# Patient Record
Sex: FEMALE | Race: WHITE
[De-identification: names, ages, dates, MRNs, and addresses within clinical notes are randomized per-mention and may not be internally consistent; named-entity substitution may affect disease eponyms.]

---

## 2021-05-29 ENCOUNTER — HOSPITAL ENCOUNTER (EMERGENCY)
Dept: HOSPITAL 33 - ED | Age: 81
Discharge: HOME | End: 2021-05-29
Payer: MEDICARE

## 2021-05-29 VITALS — HEART RATE: 78 BPM | SYSTOLIC BLOOD PRESSURE: 186 MMHG | DIASTOLIC BLOOD PRESSURE: 104 MMHG

## 2021-05-29 VITALS — OXYGEN SATURATION: 98 %

## 2021-05-29 DIAGNOSIS — E78.00: ICD-10-CM
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
## 2021-05-29 DIAGNOSIS — I10: ICD-10-CM
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
## 2021-05-29 DIAGNOSIS — S52.532A: Primary | ICD-10-CM

## 2021-05-29 DIAGNOSIS — I25.10: ICD-10-CM

## 2021-05-29 PROCEDURE — 29105 APPLICATION LONG ARM SPLINT: CPT

## 2021-05-29 PROCEDURE — 99284 EMERGENCY DEPT VISIT MOD MDM: CPT

## 2021-05-29 PROCEDURE — 73110 X-RAY EXAM OF WRIST: CPT

## 2021-05-29 NOTE — ERPHSYRPT
- History of Present Illness


Time Seen by Provider: 05/29/21 15:40


Source: patient


Exam Limitations: no limitations


Patient Subjective Stated Complaint: Pt states "I fell down two steps.  I 

stumbled and fell."


Triage Nursing Assessment: PT presented alert and oriented X 3, skin pwd Pt 

ambulates with assistance, pt has migdalia splint on her left lower arm. Pt CSM X 4


Physician History: 





Pt states "I fell down two steps.  I stumbled and fell." hurt my left wrist. 

denies any other injury


Occurred: just prior to arrival


Method of Injury: fell


Quality: sharpness


Severity of Pain-Max: moderate


Severity of Pain-Current: moderate


Extremities Pain Location: wrist: left


Modifying Factors: Improves With: nothing


Associated Symptoms: none


Allergies/Adverse Reactions: 








melon Adverse Reaction (Intermediate, Verified 05/29/21 15:33)


   Diarrhea


opiates Allergy (Intermediate, Uncoded 05/29/21 15:33)


   





Home Medications: 








Amlodipine Besylate 5 mg*** [Norvasc 5 mg***] 5 mg PO DAILY 05/29/21 [History]


Atorvastatin Calcium [Lipitor 40Mg] 40 mg PO DAILY 05/29/21 [History]


lisinopriL [Lisinopril] 20 mg PO DAILY 05/29/21 [History]





Hx Tetanus, Diphtheria Vaccination/Date Given: Yes


Hx Influenza Vaccination/Date Given: Yes


Hx Pneumococcal Vaccination/Date Given: No


Immunizations Up to Date: Yes





Travel Risk





- International Travel


Have you traveled outside of the country in past 3 weeks: No





- Coronavirus Screening


Are you exhibiting any of the following symptoms?: No


Close contact with a COVID-19 positive Pt in past 14-21 Days: No





- Vaccine Status


Have you recieved a Covid-19 vaccination: Yes


: Pfizer





- Vaccination Dates


Date of 2cond Vaccination (if applicable): 02/2021





- Review of Systems


Constitutional: No Fever, No Chills


Eyes: No Symptoms


Ears, Nose, & Throat: No Symptoms


Respiratory: No Cough, No Dyspnea


Cardiac: No Chest Pain, No Edema, No Syncope


Abdominal/Gastrointestinal: No Abdominal Pain, No Nausea, No Vomiting, No 

Diarrhea


Genitourinary Symptoms: No Dysuria


Musculoskeletal: Deformity (left wrist), Fall, No Back Pain, No Neck Pain


Skin: No Rash


Neurological: No Dizziness, No Focal Weakness, No Sensory Changes


Psychological: No Symptoms


Endocrine: No Symptoms


All Other Systems: Reviewed and Negative





- Past Medical History


Pertinent Past Medical History: Yes


Cardiac History: Coronary Artery Disease, High Cholesterol, Hypertension





- Past Surgical History


Past Surgical History: Yes


Other Surgical History: hysterectomy.  tonsils.  appi





- Social History


Smoking Status: Never smoker


Exposure to second hand smoke: No


Drug Use: none


Patient Lives Alone: No





- Nursing Vital Signs


Nursing Vital Signs: 


                               Initial Vital Signs











Temperature  98.1 F   05/29/21 15:23


 


Pulse Rate  80   05/29/21 15:23


 


Respiratory Rate  20   05/29/21 15:23


 


Blood Pressure  170/92   05/29/21 15:23


 


O2 Sat by Pulse Oximetry  98   05/29/21 15:23








                                   Pain Scale











Pain Intensity                 6

















- Physical Exam


General Appearance: alert


Eyes, Ears, Nose, Throat Exam: moist mucous membranes


Neck Exam: non-tender, supple


Cardiovascular/Respiratory Exam: chest non-tender, normal breath sounds, regular

 rate/rhythm, no respiratory distress


Abdominal Exam: non-tender, No guarding


Back Exam: normal inspection, No vertebral tenderness


Wrist Exam: asymmetry, bone tenderness, deformity (left wrist), limited ROM, 

pain, soft tissue tenderness


Neuro/Tendon Exam: normal sensation, normal motor functions


Mental Status Exam: alert, oriented x 3, cooperative


Skin Exam: normal color, warm, dry


SpO2: 98





Procedures





- Splinting


Time of Procedure: 16:10


Location of Splint: Left, Wrist


Type of Splint: Orthoglass Long Arm Splint (reverse sugar tong splint)


Splint Applied By: ED Nurse


Pre-Proc Neuro Vasc Exam: normal


Post-Proc Neuro Vasc Exam: neurovascular intact





- Course


Nursing assessment & vital signs reviewed: Yes





- Radiology Exams


  ** Wrist


X-ray Interpretation: Reviewed by me (COLLE'S FRACTURE Left)


Ordered Tests: 


                               Active Orders 24 hr











 Category Date Time Status


 


 WRIST (MIN 3 VIEWS) Stat Exams  05/29/21 15:39 Taken














- Progress


Progress: improved, pain not gone completely


Progress Note: 





05/29/21 16:11


Patient is from out of town.  So she is advised to follow-up with orthopedic 

surgeon on Monday or Tuesday.  She is informed that at this point of time we 

have given her temporary cast for her fracture of her left wrist.


Counseled pt/family regarding: diagnosis, need for follow-up, rad results





- Departure


Departure Disposition: Home


Clinical Impression: 


Colles' fracture of left radius


Qualifiers:


 Encounter type: initial encounter Fracture type: closed Qualified Code(s): 

S52.532A - Colles' fracture of left radius, initial encounter for closed 

fracture





Condition: Stable


Critical Care Time: No


Instructions:  Colles' Fracture (DC)


Additional Instructions: 


Patient is from out of town.  So she is advised to follow-up with orthopedic 

surgeon on Monday or Tuesday.  She is informed that at this point of time we 

have given her temporary cast for her fracture of her left wrist.


Prescriptions: 


Naproxen 375 mg*** [Naprosyn 375 mg***] 375 mg PO Q8H #20 tablet

## 2021-05-29 NOTE — XRAY
Indication: Pain following fall.



Comparison: None



3 view left wrist demonstrates comminuted and angulated distal radius fracture

with intra-articular extension and soft tissue swelling.  Also nondisplaced

impacted distal ulna and ulnar styloid fracture.  Elsewhere osteopenia and

moderate degenerative changes 1st metacarpal multangular scaphoid articulation.